# Patient Record
Sex: MALE | Race: WHITE | NOT HISPANIC OR LATINO | URBAN - METROPOLITAN AREA
[De-identification: names, ages, dates, MRNs, and addresses within clinical notes are randomized per-mention and may not be internally consistent; named-entity substitution may affect disease eponyms.]

---

## 2023-09-09 ENCOUNTER — EMERGENCY (EMERGENCY)
Facility: HOSPITAL | Age: 38
LOS: 1 days | Discharge: HOME | End: 2023-09-09
Attending: EMERGENCY MEDICINE | Admitting: EMERGENCY MEDICINE
Payer: COMMERCIAL

## 2023-09-09 VITALS
HEART RATE: 74 BPM | SYSTOLIC BLOOD PRESSURE: 122 MMHG | OXYGEN SATURATION: 99 % | RESPIRATION RATE: 16 BRPM | TEMPERATURE: 98 F | DIASTOLIC BLOOD PRESSURE: 84 MMHG

## 2023-09-09 VITALS
DIASTOLIC BLOOD PRESSURE: 91 MMHG | OXYGEN SATURATION: 99 % | SYSTOLIC BLOOD PRESSURE: 137 MMHG | RESPIRATION RATE: 16 BRPM | TEMPERATURE: 98 F | HEART RATE: 94 BPM

## 2023-09-09 DIAGNOSIS — S52.122A DISPLACED FRACTURE OF HEAD OF LEFT RADIUS, INITIAL ENCOUNTER FOR CLOSED FRACTURE: ICD-10-CM

## 2023-09-09 DIAGNOSIS — Z79.84 LONG TERM (CURRENT) USE OF ORAL HYPOGLYCEMIC DRUGS: ICD-10-CM

## 2023-09-09 DIAGNOSIS — I10 ESSENTIAL (PRIMARY) HYPERTENSION: ICD-10-CM

## 2023-09-09 DIAGNOSIS — Z76.0 ENCOUNTER FOR ISSUE OF REPEAT PRESCRIPTION: ICD-10-CM

## 2023-09-09 DIAGNOSIS — M25.522 PAIN IN LEFT ELBOW: ICD-10-CM

## 2023-09-09 DIAGNOSIS — Z88.0 ALLERGY STATUS TO PENICILLIN: ICD-10-CM
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
## 2023-09-09 DIAGNOSIS — E11.9 TYPE 2 DIABETES MELLITUS WITHOUT COMPLICATIONS: ICD-10-CM
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
## 2023-09-09 PROCEDURE — 73090 X-RAY EXAM OF FOREARM: CPT | Mod: 26,LT

## 2023-09-09 PROCEDURE — 73060 X-RAY EXAM OF HUMERUS: CPT | Mod: 26,LT

## 2023-09-09 PROCEDURE — 73110 X-RAY EXAM OF WRIST: CPT | Mod: 26,LT

## 2023-09-09 PROCEDURE — 99284 EMERGENCY DEPT VISIT MOD MDM: CPT | Mod: 57

## 2023-09-09 PROCEDURE — 24600 TX CLSD ELBOW DISLC W/O ANES: CPT | Mod: 54,LT

## 2023-09-09 PROCEDURE — 73080 X-RAY EXAM OF ELBOW: CPT | Mod: 26,LT

## 2023-09-09 RX ORDER — OXYCODONE AND ACETAMINOPHEN 5; 325 MG/1; MG/1
1 TABLET ORAL
Qty: 12 | Refills: 0
Start: 2023-09-09 | End: 2023-09-11

## 2023-09-09 RX ORDER — IBUPROFEN 200 MG
600 TABLET ORAL ONCE
Refills: 0 | Status: COMPLETED | OUTPATIENT
Start: 2023-09-09 | End: 2023-09-09

## 2023-09-09 RX ADMIN — Medication 600 MILLIGRAM(S): at 03:19

## 2023-09-09 NOTE — ED PROVIDER NOTE - LOCATION
There is lateral deformity at the radial head, patient unable to fully extend at elbow.  There is no crepitus his skin is otherwise intact with no tenting.  There is intact medial radial ulnar nerves to motor and sensory.  +2 radial pulse.  Nontender wrist and shoulder with full range of motion at those joints./elbow

## 2023-09-09 NOTE — ED PROVIDER NOTE - CARE PROVIDER_API CALL
David Dexter  Orthopaedic Surgery  [address] [place] [address], [address]  [place] [address]  Phone: [phone]  Fax: [phone]  Follow Up Time:

## 2023-09-09 NOTE — ED ADULT NURSE NOTE - NSFALLRISKINTERV_ED_ALL_ED

## 2023-09-09 NOTE — ED PROCEDURE NOTE - NS ED PERI VASCULAR NEG
fingers/toes warm to touch/no paresthesia/no cyanosis of extremity/capillary refill time < 2 seconds
fingers/toes warm to touch/no paresthesia/no swelling/no cyanosis of extremity/capillary refill time < 2 seconds
fingers/toes warm to touch/no paresthesia/no cyanosis of extremity/capillary refill time < 2 seconds

## 2023-09-09 NOTE — ED PROVIDER NOTE - PROGRESS NOTE DETAILS
Super busy schedule patient reduced and placed into posterior splint at 90 degrees flexion.  He is neurovascular intact after reduction.  I shared images with Dr. Mitchell and he will follow-up with Dr. Bell on Tuesday for surgery booking.  I advised patient to return for any worsening symptoms numbness or paresthesias at this time he is very comfortable and will call with pain control

## 2023-09-09 NOTE — ED ADULT NURSE REASSESSMENT NOTE - NS ED NURSE REASSESS COMMENT FT1
Pt. received AAOx3 semi fowlers in stretcher breathing at ease on room air in no apparent distress. Pt. has no complaints at this time. Pending dispo.

## 2023-09-09 NOTE — ED PROVIDER NOTE - PATIENT PORTAL LINK FT
You can access the FollowMyHealth Patient Portal offered by [facility] by registering at the following website: [email or web address]. By joining [facility]’s FollowMyHealth portal, you will also be able to view your health information using other applications (apps) compatible with our system.

## 2023-09-09 NOTE — ED PROCEDURE NOTE - CPROC ED POST PROC CARE GUIDE1
Verbal/written post procedure instructions were given to patient/caregiver./Instructed patient/caregiver to follow-up with primary care physician./Elevate the injured extremity as instructed./Keep the cast/splint/dressing clean and dry.
Verbal/written post procedure instructions were given to patient/caregiver./Instructed patient/caregiver to follow-up with primary care physician./Instructed patient/caregiver regarding signs and symptoms of infection./Elevate the injured extremity as instructed./Keep the cast/splint/dressing clean and dry.
Verbal/written post procedure instructions were given to patient/caregiver./Instructed patient/caregiver to follow-up with primary care physician./Elevate the injured extremity as instructed./Keep the cast/splint/dressing clean and dry.

## 2023-09-09 NOTE — ED PROCEDURE NOTE - NS ED FORMED SPLINTS 1
posterior elbow splint in 90 degree flexion
Posterior Splint
posterior elbow splint with elbow flexion in 90 degree

## 2023-09-09 NOTE — ED PROCEDURE NOTE - CPROC ED INDICATIONS1
fx dislocation of the L elbow/fractured extremity
fractured extremity/subluxed fracture
fx dislocation of the L elbow

## 2023-09-09 NOTE — ED PROCEDURE NOTE - NS ED PROC ASSISTED BY1
Shana Jacinto; Jaime Xiong PGY 3
Delmis Duran PA-C; Jaime Xiong PGY 3
resident ED Dr Xiong, ACP Delmis Ortega

## 2023-09-09 NOTE — ED PROVIDER NOTE - OBJECTIVE STATEMENT
39 yo M, pmhx of NIDDM,  hypertension, on metformin, and Ozempic, who presents with mechanical fall onto left elbow.  Patient notes that he missed a step while leaving a restaurant and fell onto the left left elbow now presents with left elbow deformity.  He denies numbness paresthesias to left elbow.  Denies left shoulder pain left wrist pain or left forearm pain.  Denies anticoagulants.  Denies head or neck injury.  Denies prior injury to left upper extremity.  Patient is right-hand dominant

## 2023-09-09 NOTE — ED ADULT TRIAGE NOTE - CHIEF COMPLAINT QUOTE
Pt BIBA s/p trip and fall from standing, states he "missed the step up on the sidewalk", landing on left elbow, causing injury/pain. LUE splinted from EMS. Pt denies distal numbness or tingling. +distal pulses.

## 2023-09-09 NOTE — ED PROCEDURE NOTE - CPROC ED POST RADIOGRAPHY1
post-procedure radiography performed/fracture reduced, correctly positioned
post-procedure radiography performed/extremity correctly positioned, splinted
post-procedure radiography performed/fracture reduced, correctly positioned

## 2023-09-09 NOTE — ED PROVIDER NOTE - NSFOLLOWUPINSTRUCTIONS_ED_ALL_ED_FT
Elbow Dislocation  Close-up of the elbow showing the humerus, ulna, and radius. The bones are moved out of place due to a torn ligament.  Elbow dislocation is an injury in which the bones in your elbow joint are moved out of place. Three bones come together to form your elbow:  The humerus. This is the bone in your upper arm.  The radius and ulna. These are the two bones in your forearm that form the lower part of your elbow.  What are the causes?  Common causes of this condition include:  Falling on your arm when you are reaching out with it.  A car accident.  What increases the risk?  You are more likely to have this injury if you were born with:  Ligaments that are looser than normal. Ligaments are tissues that connect the bones to each other.  An ulna bone that has a shallow groove for the elbow hinge joint.  What are the signs or symptoms?  Common symptoms of this condition include:  Pain when you move your elbow.  Pain or tenderness when you press on your elbow.  Swelling around your elbow.  Bruising on the inside and outside of your elbow.  Symptoms of a more serious dislocation include:  Very bad pain.  A change in the normal shape of your arm (deformity).  Not being able to move your elbow.  Bruising and swelling.  Loss of feeling (numbness) or weakness below your elbow.  Coolness or a white-bluish color of the skin below your elbow.  How is this treated?  Treatment depends on how bad the dislocation is. In many cases, treatment will include:  A procedure to move your elbow back into its normal position (closed reduction).  Wearing a splint or sling for 2–3 weeks.  Doing exercises (physical therapy) to get back your movement and strength.  A more serious dislocation may require surgery to put the bones back into place (open reduction). After surgery:  You will wear a splint or sling for several weeks.  You will do physical therapy.  Follow these instructions at home:  If you have a splint or sling:    Wear the splint or sling as told by your doctor. Remove it only as told by your doctor.  Loosen the splint or sling if your fingers:  Tingle.  Become numb.  Turn cold and blue.  Keep the splint or sling clean.  If the splint or sling is not waterproof:  Do not let it get wet.  Cover it with a watertight covering when you take a bath or shower.  Bathing    Do not take baths, swim, or use a hot tub until your doctor says it is okay. Ask your doctor if you may take showers. You may only be allowed to take sponge baths.  Managing pain, stiffness, and swelling    Bag of ice on a towel on the skin.  If told, put ice on the injured area.  If you have a removable splint or sling, remove it as told by your doctor.  Put ice in a plastic bag.  Place a towel between your skin and the bag.  Leave the ice on for 20 minutes, 2–3 times a day.  Move your fingers often to avoid stiffness and to lessen swelling.  Raise (elevate) the injured area above the level of your heart while you are sitting or lying down.  Driving    Do not drive or use heavy machinery while taking prescription pain medicine.  Ask your doctor when it is safe to drive if you are wearing a sling or splint on your arm.  Activity    Rest your elbow as told by your doctor.  Return to your normal activities as told by your doctor. Ask your doctor what activities are safe for you.  Do exercises as told by your doctor.  General instructions    Do not put pressure on any part of the splint until it is fully hardened. This may take many hours.  Take over-the-counter and prescription medicines only as told by your doctor.  Do not use any products that contain nicotine or tobacco, such as cigarettes, e-cigarettes, and chewing tobacco. These can delay bone healing. If you need help quitting, ask your doctor.  Ask your doctor if the medicine you are taking can cause trouble pooping (constipation). You may need to take steps to prevent or treat trouble pooping:  Drink enough fluid to keep your pee (urine) pale yellow.  Take over-the-counter or prescription medicines.  Eat foods that are high in fiber. These include beans, whole grains, and fresh fruits and vegetables.  Limit foods that are high in fat and sugar. These include fried or sweet foods.  Keep all follow-up visits as told by your doctor. This is important.  Contact a doctor if:  Your pain gets worse.  Your splint or sling gets damaged.  Get help right away if:  You lose feeling in your arm or hand.  Your arm or hand turns pale and cold.  Summary  Elbow dislocation is an injury in which the bones in your elbow joint are moved out of place.  Treatment will depend on how bad the dislocation is.  Wear a splint or sling as told by your doctor.  If told, put ice on the injured area.  This information is not intended to replace advice given to you by your health care provider. Make sure you discuss any questions you have with your health care provider.

## 2023-09-09 NOTE — ED PROCEDURE NOTE - NS ED PERI NEURO NEG
Pre-application: Motor, sensory, and vascular responses intact in the injured extremity./Post-application: Motor, sensory, and vascular responses intact in the injured extremity./The patient/caregiver verbalized understanding of how to care for the injured extremity with splint

## 2023-09-09 NOTE — ED PROVIDER NOTE - CLINICAL SUMMARY MEDICAL DECISION MAKING FREE TEXT BOX
Patient presents with left elbow lateral deformity likely consistent with elbow fracture.  We will do x-rays of left elbow forearm humerus and wrist.  Rule out any distal fracture.  He presents neurovascular intact to pain control with Percocet and ibuprofen.  Will obtain orthopedics consult.  Likely require immobilization.  Plus or minus reduction

## 2023-09-09 NOTE — ED PROCEDURE NOTE - CPROC ED FRACTUR REDUC DETAIL1
The anatomic location of the fracture was identified, and patient was properly positioned. Using the appropriate technique, fracture reduction was performed. Patient was placed in an immobilizer/splint.
The anatomic location of the fracture was identified, and patient was properly positioned. Using the appropriate technique, fracture reduction was performed. Patient was placed in an immobilizer/splint.

## 2023-09-09 NOTE — ED ADULT NURSE NOTE - OBJECTIVE STATEMENT
Not Applicable Patient A&O x 4, S/P fall from tripping on sidewalk and landing on his left elbow. Patient presents to the ER with a splint from EMS, has sensation to his fingertips, able to move fingers, + distal pulses. Patient has regular unlabored breathing, VSS, all safety measures in place. Will continue to provide care for patient.